# Patient Record
Sex: FEMALE | Race: WHITE | NOT HISPANIC OR LATINO | URBAN - METROPOLITAN AREA
[De-identification: names, ages, dates, MRNs, and addresses within clinical notes are randomized per-mention and may not be internally consistent; named-entity substitution may affect disease eponyms.]

---

## 2019-09-19 ENCOUNTER — OFFICE VISIT (OUTPATIENT)
Dept: URGENT CARE | Facility: CLINIC | Age: 29
End: 2019-09-19
Payer: COMMERCIAL

## 2019-09-19 VITALS
TEMPERATURE: 97.6 F | WEIGHT: 190 LBS | HEIGHT: 67 IN | BODY MASS INDEX: 29.82 KG/M2 | HEART RATE: 99 BPM | OXYGEN SATURATION: 98 % | RESPIRATION RATE: 18 BRPM | SYSTOLIC BLOOD PRESSURE: 153 MMHG | DIASTOLIC BLOOD PRESSURE: 95 MMHG

## 2019-09-19 DIAGNOSIS — J20.9 ACUTE BRONCHITIS, UNSPECIFIED ORGANISM: Primary | ICD-10-CM
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
## 2019-09-19 PROCEDURE — 99203 OFFICE O/P NEW LOW 30 MIN: CPT | Performed by: PHYSICIAN ASSISTANT

## 2019-09-19 RX ORDER — LIDOCAINE 50 MG/G
PATCH TOPICAL
Refills: 0 | COMMUNITY
Start: 2019-08-23

## 2019-09-19 RX ORDER — METHOCARBAMOL 750 MG/1
750 TABLET, FILM COATED ORAL 3 TIMES DAILY
Refills: 0 | COMMUNITY
Start: 2019-08-23

## 2019-09-19 RX ORDER — DEXTROAMPHETAMINE SACCHARATE, AMPHETAMINE ASPARTATE, DEXTROAMPHETAMINE SULFATE AND AMPHETAMINE SULFATE 7.5; 7.5; 7.5; 7.5 MG/1; MG/1; MG/1; MG/1
TABLET ORAL
Refills: 0 | COMMUNITY
Start: 2019-06-27

## 2019-09-19 RX ORDER — ALBUTEROL SULFATE 90 UG/1
2 AEROSOL, METERED RESPIRATORY (INHALATION) EVERY 6 HOURS PRN
Qty: 18 G | Refills: 0 | Status: SHIPPED | OUTPATIENT
Start: 2019-09-19

## 2019-09-19 RX ORDER — OXYCODONE HYDROCHLORIDE 30 MG/1
TABLET ORAL
Refills: 0 | COMMUNITY
Start: 2019-08-23

## 2019-09-19 RX ORDER — PREDNISONE 20 MG/1
20 TABLET ORAL DAILY
Qty: 5 TABLET | Refills: 0 | Status: SHIPPED | OUTPATIENT
Start: 2019-09-19 | End: 2019-09-24

## 2019-09-19 RX ORDER — MIRTAZAPINE 45 MG/1
45 TABLET, FILM COATED ORAL
Refills: 0 | COMMUNITY
Start: 2019-08-23

## 2019-09-19 RX ORDER — ARIPIPRAZOLE 15 MG/1
15 TABLET ORAL EVERY MORNING
Refills: 0 | COMMUNITY
Start: 2019-08-23

## 2019-09-19 RX ORDER — BUSPIRONE HYDROCHLORIDE 15 MG/1
15 TABLET ORAL 2 TIMES DAILY
Refills: 0 | COMMUNITY
Start: 2019-08-23

## 2019-09-19 RX ORDER — GABAPENTIN 400 MG/1
400 CAPSULE ORAL 3 TIMES DAILY
Refills: 0 | COMMUNITY
Start: 2019-08-23

## 2019-09-19 RX ORDER — LEVOTHYROXINE SODIUM 0.05 MG/1
50 TABLET ORAL EVERY MORNING
Refills: 0 | COMMUNITY
Start: 2019-08-23

## 2019-09-19 RX ORDER — LEVOTHYROXINE SODIUM 0.2 MG/1
200 TABLET ORAL EVERY MORNING
Refills: 0 | COMMUNITY
Start: 2019-08-23

## 2019-09-19 RX ORDER — DULOXETIN HYDROCHLORIDE 60 MG/1
120 CAPSULE, DELAYED RELEASE ORAL EVERY MORNING
Refills: 0 | COMMUNITY
Start: 2019-06-13

## 2019-09-19 RX ORDER — BENZONATATE 200 MG/1
200 CAPSULE ORAL 3 TIMES DAILY PRN
Qty: 20 CAPSULE | Refills: 0 | Status: SHIPPED | OUTPATIENT
Start: 2019-09-19

## 2019-09-19 NOTE — LETTER
September 19, 2019     Patient: Kiley Grimes   YOB: 1990   Date of Visit: 9/19/2019       To Whom It May Concern: It is my medical opinion that Kiley Grimes may return to work on 9/21/2019  If you have any questions or concerns, please don't hesitate to call           Sincerely,        Tera Puri PA-C    CC: No Recipients

## 2019-09-20 NOTE — PATIENT INSTRUCTIONS
Take Tessalon, albuterol and prednisone as prescribed  Mucinex during the day   Some studies suggest that Zinc 12 5-15mg every 2 hours while awake x 5 days may shorten the duration cold symptoms by 1-2 days  Total zinc should not exceed 75mg per day  Fluids and rest  Tylenol/Ibuprofen for pain/fever  Salt water gargles and chloraseptic spray  Throat Coat Tea  Warm compresses over sinuses  Steam treatment (utilize proper safety precautions when in contact with hot water/steam)  Call   Follow up with PCP in 3-5 days  Proceed to  ER if symptoms worsen  Acute Bronchitis   WHAT YOU NEED TO KNOW:   Acute bronchitis is swelling and irritation in the air passages of your lungs  This irritation may cause you to cough or have other breathing problems  Acute bronchitis often starts because of another illness, such as a cold or the flu  The illness spreads from your nose and throat to your windpipe and airways  Bronchitis is often called a chest cold  Acute bronchitis lasts about 3 to 6 weeks and is usually not a serious illness  Your cough can last for several weeks  DISCHARGE INSTRUCTIONS:   Return to the emergency department if:   · You cough up blood  · Your lips or fingernails turn blue  · You feel like you are not getting enough air when you breathe  Contact your healthcare provider if:   · You have a fever  · Your breathing problems do not go away or get worse  · Your cough does not get better within 4 weeks  · You have questions or concerns about your condition or care  Self-care:   · Get more rest   Rest helps your body to heal  Slowly start to do more each day  Rest when you feel it is needed  · Avoid irritants in the air  Avoid chemicals, fumes, and dust  Wear a face mask if you must work around dust or fumes  Stay inside on days when air pollution levels are high  If you have allergies, stay inside when pollen counts are high   Do not use aerosol products, such as spray-on deodorant, bug spray, and hair spray  · Do not smoke or be around others who smoke  Nicotine and other chemicals in cigarettes and cigars damages the cilia that move mucus out of your lungs  Ask your healthcare provider for information if you currently smoke and need help to quit  E-cigarettes or smokeless tobacco still contain nicotine  Talk to your healthcare provider before you use these products  · Drink liquids as directed  Liquids help keep your air passages moist and help you cough up mucus  You may need to drink more liquids when you have acute bronchitis  Ask how much liquid to drink each day and which liquids are best for you  · Use a humidifier or vaporizer  Use a cool mist humidifier or a vaporizer to increase air moisture in your home  This may make it easier for you to breathe and help decrease your cough  Decrease risk for acute bronchitis:   · Get the vaccinations you need  Ask your healthcare provider if you should get vaccinated against the flu or pneumonia  · Prevent the spread of germs  You can decrease your risk of acute bronchitis and other illnesses by doing the following:     [facility] your hands often with soap and water  Carry germ-killing hand lotion or gel with you  You can use the lotion or gel to clean your hands when soap and water are not available  ¨ Do not touch your eyes, nose, or mouth unless you have washed your hands first     ¨ Always cover your mouth when you cough to prevent the spread of germs  It is best to cough into a tissue or your shirt sleeve instead of into your hand  Ask those around you cover their mouths when they cough  ¨ Try to avoid people who have a cold or the flu  If you are sick, stay away from others as much as possible  Medicines: Your healthcare provider may  give you any of the following:  · Ibuprofen or acetaminophen  are medicines that help lower your fever  They are available without a doctor's order   Ask your healthcare provider which medicine is right for you  Ask how much to take and how often to take it  Follow directions  These medicines can cause stomach bleeding if not taken correctly  Ibuprofen can cause kidney damage  Do not take ibuprofen if you have kidney disease, an ulcer, or allergies to aspirin  Acetaminophen can cause liver damage  Do not take more than 4,000 milligrams in 24 hours  · Decongestants  help loosen mucus in your lungs and make it easier to cough up  This can help you breathe easier  · Cough suppressants  decrease your urge to cough  If your cough produces mucus, do not take a cough suppressant unless your healthcare provider tells you to  Your healthcare provider may suggest that you take a cough suppressant at night so you can rest     · Inhalers  may be given  Your healthcare provider may give you one or more inhalers to help you breathe easier and cough less  An inhaler gives your medicine to open your airways  Ask your healthcare provider to show you how to use your inhaler correctly  · Take your medicine as directed  Contact your healthcare provider if you think your medicine is not helping or if you have side effects  Tell him of her if you are allergic to any medicine  Keep a list of the medicines, vitamins, and herbs you take  Include the amounts, and when and why you take them  Bring the list or the pill bottles to follow-up visits  Carry your medicine list with you in case of an emergency  Follow up with your healthcare provider as directed:  Write down questions you have so you will remember to ask them during your follow-up visits  © 2017 [employer] Information is for End User's use only and may not be sold, redistributed or otherwise used for commercial purposes  All illustrations and images included in CareNotes® are the copyrighted property of [employer]  or [employer]  The above information is an  only   It is not intended as medical advice for individual conditions or treatments  Talk to your doctor, nurse or pharmacist before following any medical regimen to see if it is safe and effective for you  Bed Bugs   WHAT YOU NEED TO KNOW:   Bed bugs are small, flat insects that bite your exposed skin and feed on your blood while you sleep  They can spread from person to person  They hide in the folds and seams of bed linens, furniture cracks, and electrical outlets  They are common in areas of frequent travel or buildings with shared walls, such as hotels or apartments  Do not scratch bite marks  Scratching can cause a skin infection  DISCHARGE INSTRUCTIONS:   Call 911 for any of the following:   · You have trouble breathing  · You have swelling in your lips, tongue, or throat  Return to the emergency department if:   · You feel lightheaded, dizzy, or faint  · Your heart is beating faster than usual     · You are vomiting and cannot stop  Contact your healthcare provider if:   · You have very swollen, painful bite marks  · You have a fever  · You have open sores that are draining pus  · You have questions or concerns about your condition or care  Medicines:   · Medicines  may be given to help decrease itching and inflammation  These may be given as a pill, cream, or ointment  · Take your medicine as directed  Contact your healthcare provider if you think your medicine is not helping or if you have side effects  Tell him of her if you are allergic to any medicine  Keep a list of the medicines, vitamins, and herbs you take  Include the amounts, and when and why you take them  Bring the list or the pill bottles to follow-up visits  Carry your medicine list with you in case of an emergency  Prevent bed bugs:   · Protect your clothes and luggage when you travel  Inspect them often for bed bugs  Keep your luggage closed tightly when you are not using it   Keep your luggage in the bathroom, or place contents in sealed plastic bags  · Inspect any used items you bring into your home  You may need to fumigate used furniture  Examine cardboard boxes or other items with small cracks where bed bugs could hide  · Remove clutter from the area where you sleep  Place your mattress or box spring in a sealed bag  Seal any cracks or molding in the walls or furniture  · Wash bed linens and clothes in hot, soapy water  Wash the items in water that is at least 130°F (50°C) for 2 hours, or 20°F (-5°C) or colder for 5 days  Dry them in a dryer on the hot setting for at least 20 minutes  Dry cleaning is also effective to get rid of bed bugs  · Tell someone about the bed bugs  This may include a landlord, , or pest control company  Insecticide sprays are used to get rid of bed bugs  Follow up with your healthcare provider as directed:  Write down your questions so you remember to ask them during your visits  © 2017 [facility] Information is for End User's use only and may not be sold, redistributed or otherwise used for commercial purposes  All illustrations and images included in CareNotes® are the copyrighted property of [employer]  or [employer]  The above information is an  only  It is not intended as medical advice for individual conditions or treatments  Talk to your doctor, nurse or pharmacist before following any medical regimen to see if it is safe and effective for you

## 2019-09-20 NOTE — PROGRESS NOTES
[facility]        NAME: Laura Turner is a 30 y o  female  : 1990    MRN: [id number]  DATE: 2019  TIME: 8:44 PM    Assessment and Plan   Acute bronchitis, unspecified organism [J20 9]  1  Acute bronchitis, unspecified organism  benzonatate (TESSALON) 200 MG capsule    albuterol (VENTOLIN HFA) 90 mcg/act inhaler    predniSONE 20 mg tablet   2  Bedbug bite, initial encounter           Patient Instructions     Take Tessalon, albuterol and prednisone as prescribed  Mucinex during the day   Some studies suggest that Zinc 12 5-15mg every 2 hours while awake x 5 days may shorten the duration cold symptoms by 1-2 days  Total zinc should not exceed 75mg per day  Fluids and rest  Tylenol/Ibuprofen for pain/fever  Salt water gargles and chloraseptic spray  Throat Coat Tea  Warm compresses over sinuses  Steam treatment (utilize proper safety precautions when in contact with hot water/steam)  Call   Follow up with PCP in 3-5 days  Proceed to  ER if symptoms worsen  Chief Complaint     Chief Complaint   Patient presents with    Cold Like Symptoms     sinus and chest congestion with cough x 1 week  Missed work and needs note for the past 2 days    Rash     rash on arms and chest for 1 week  Thinks friends cat has fleas  Had bed bugs 2 months ago- had apartment exterminatedr         History of Present Illness       Patient had house treated for bed bugs a few months prior  Sinus symptoms improving  Rash   This is a new problem  The current episode started in the past 7 days  The problem is unchanged  The rash is diffuse  The rash is characterized by redness and itchiness  It is unknown if there was an exposure to a precipitant  Associated symptoms include congestion, coughing, fatigue and rhinorrhea  Pertinent negatives include no diarrhea, fever, shortness of breath, sore throat or vomiting  Past treatments include nothing  URI    This is a new problem   The current episode started in the past 7 days  There has been no fever  Associated symptoms include congestion, coughing, ear pain, headaches, rhinorrhea and sinus pain  Pertinent negatives include no abdominal pain, chest pain, diarrhea, nausea, rash, sneezing, sore throat or vomiting  She has tried nothing for the symptoms  Review of Systems   Review of Systems   Constitutional: Positive for fatigue  Negative for activity change, appetite change, chills and fever  HENT: Positive for congestion, ear pain, rhinorrhea, sinus pressure, sinus pain and voice change  Negative for dental problem, ear discharge, facial swelling, postnasal drip, sneezing, sore throat and trouble swallowing  Eyes: Negative for itching  Respiratory: Positive for cough and chest tightness  Negative for shortness of breath  Cardiovascular: Negative for chest pain and palpitations  Gastrointestinal: Negative for abdominal pain, constipation, diarrhea, nausea and vomiting  Musculoskeletal: Negative for myalgias  Skin: Negative for rash  Neurological: Positive for headaches  Negative for dizziness, weakness and light-headedness           Current Medications       Current Outpatient Medications:     amphetamine-dextroamphetamine (ADDERALL) 30 MG tablet, take 1 tablet by mouth twice a day, Disp: , Rfl: 0    ARIPiprazole (ABILIFY) 15 mg tablet, Take 15 mg by mouth every morning, Disp: , Rfl: 0    busPIRone (BUSPAR) 15 mg tablet, Take 15 mg by mouth 2 (two) times a day, Disp: , Rfl: 0    DULoxetine (CYMBALTA) 60 mg delayed release capsule, Take 120 mg by mouth every morning, Disp: , Rfl: 0    gabapentin (NEURONTIN) 400 mg capsule, Take 400 mg by mouth 3 (three) times a day, Disp: , Rfl: 0    levothyroxine 200 mcg tablet, Take 200 mcg by mouth every morning Take on an empty stomach, Disp: , Rfl: 0    levothyroxine 50 mcg tablet, Take 50 mcg by mouth every morning Take on an empty stomach, Disp: , Rfl: 0    lidocaine (LIDODERM) 5 %, apply 1 patch to the affected area daily  Leave on for 12 hours and then off for 12 hours  , Disp: , Rfl: 0    methocarbamol (ROBAXIN) 750 mg tablet, 750 mg 3 (three) times a day, Disp: , Rfl: 0    mirtazapine (REMERON) 45 MG tablet, Take 45 mg by mouth daily at bedtime, Disp: , Rfl: 0    oxyCODONE (ROXICODONE) 30 MG immediate release tablet, TAKE 1 TO 2 TABLETS 4 TIMES A DAY, Disp: , Rfl: 0    albuterol (VENTOLIN HFA) 90 mcg/act inhaler, Inhale 2 puffs every 6 (six) hours as needed (bronchitis), Disp: 18 g, Rfl: 0    benzonatate (TESSALON) 200 MG capsule, Take 1 capsule (200 mg total) by mouth 3 (three) times a day as needed for cough, Disp: 20 capsule, Rfl: 0    predniSONE 20 mg tablet, Take 1 tablet (20 mg total) by mouth daily for 5 days, Disp: 5 tablet, Rfl: 0    Current Allergies     Allergies as of 09/19/2019 - never reviewed   Allergen Reaction Noted    Latex Anaphylaxis 09/19/2019            The following portions of the patient's history were reviewed and updated as appropriate: allergies, current medications, past family history, past medical history, past social history, past surgical history and problem list      Past Medical History:   Diagnosis Date    ADHD (attention deficit hyperactivity disorder)     Anxiety     Depression     Disease of thyroid gland     DJD (degenerative joint disease)     Fibromyalgia        Past Surgical History:   Procedure Laterality Date    CHOLECYSTECTOMY      GASTRIC BYPASS      REDUCTION MAMMAPLASTY         Family History   Problem Relation Age of Onset    No Known Problems Mother     No Known Problems Father          Medications have been verified  Objective   /95   Pulse 99   Temp 97 6 °F (36 4 °C) (Tympanic)   Resp 18   Ht 5' 7" (1 702 m)   Wt 86 2 kg (190 lb)   LMP 09/17/2019   SpO2 98%   BMI 29 76 kg/m²        Physical Exam     Physical Exam   Constitutional: She appears well-developed and well-nourished  No distress     HENT:   Head: Normocephalic  Right Ear: External ear normal    Left Ear: External ear normal    Nose: Nose normal    Mouth/Throat: Oropharynx is clear and moist  No oropharyngeal exudate  B/L maxillary sinus TTP   Eyes: Conjunctivae are normal    Cardiovascular: Normal rate, regular rhythm, normal heart sounds and intact distal pulses  Exam reveals no gallop and no friction rub  No murmur heard  Pulmonary/Chest: Effort normal and breath sounds normal  No respiratory distress  She has no wheezes  She has no rales  She exhibits no tenderness  Lymphadenopathy:     She has no cervical adenopathy  Neurological: She is alert  Skin: Skin is warm  Rash noted  She is not diaphoretic  Psychiatric: She has a normal mood and affect  Her behavior is normal  Judgment and thought content normal    Vitals reviewed